# Patient Record
Sex: MALE | Race: WHITE | NOT HISPANIC OR LATINO | Employment: OTHER | URBAN - METROPOLITAN AREA
[De-identification: names, ages, dates, MRNs, and addresses within clinical notes are randomized per-mention and may not be internally consistent; named-entity substitution may affect disease eponyms.]

---

## 2017-02-23 ENCOUNTER — OFFICE VISIT (OUTPATIENT)
Dept: INTERNAL MEDICINE | Facility: CLINIC | Age: 62
End: 2017-02-23

## 2017-02-23 VITALS
DIASTOLIC BLOOD PRESSURE: 80 MMHG | BODY MASS INDEX: 28.06 KG/M2 | WEIGHT: 196 LBS | HEIGHT: 70 IN | HEART RATE: 64 BPM | SYSTOLIC BLOOD PRESSURE: 120 MMHG

## 2017-02-23 DIAGNOSIS — Z00.00 ROUTINE GENERAL MEDICAL EXAMINATION AT A HEALTH CARE FACILITY: ICD-10-CM

## 2017-02-23 DIAGNOSIS — K57.30 DIVERTICULOSIS OF LARGE INTESTINE WITHOUT HEMORRHAGE: ICD-10-CM

## 2017-02-23 DIAGNOSIS — I10 ESSENTIAL HYPERTENSION: Primary | ICD-10-CM

## 2017-02-23 LAB
ALBUMIN SERPL-MCNC: 4.4 G/DL (ref 3.2–4.8)
ALBUMIN/GLOB SERPL: 1.6 G/DL (ref 1.5–2.5)
ALP SERPL-CCNC: 51 U/L (ref 25–100)
ALT SERPL W P-5'-P-CCNC: 28 U/L (ref 7–40)
ANION GAP SERPL CALCULATED.3IONS-SCNC: 2 MMOL/L (ref 3–11)
ARTICHOKE IGE QN: 143 MG/DL (ref 0–130)
AST SERPL-CCNC: 28 U/L (ref 0–33)
BILIRUB BLD-MCNC: NEGATIVE MG/DL
BILIRUB SERPL-MCNC: 0.7 MG/DL (ref 0.3–1.2)
BUN BLD-MCNC: 15 MG/DL (ref 9–23)
BUN/CREAT SERPL: 15 (ref 7–25)
CALCIUM SPEC-SCNC: 9.5 MG/DL (ref 8.7–10.4)
CHLORIDE SERPL-SCNC: 103 MMOL/L (ref 99–109)
CHOLEST SERPL-MCNC: 219 MG/DL (ref 0–200)
CLARITY, POC: CLEAR
CO2 SERPL-SCNC: 34 MMOL/L (ref 20–31)
COLOR UR: YELLOW
CREAT BLD-MCNC: 1 MG/DL (ref 0.6–1.3)
DEPRECATED RDW RBC AUTO: 41.3 FL (ref 37–54)
DEVELOPER EXPIRATION DATE: NORMAL
DEVELOPER LOT NUMBER: NORMAL
ERYTHROCYTE [DISTWIDTH] IN BLOOD BY AUTOMATED COUNT: 13 % (ref 11.3–14.5)
EXPIRATION DATE: NORMAL
FECAL OCCULT BLOOD SCREEN, POC: NEGATIVE
GFR SERPL CREATININE-BSD FRML MDRD: 76 ML/MIN/1.73
GLOBULIN UR ELPH-MCNC: 2.7 GM/DL
GLUCOSE BLD-MCNC: 99 MG/DL (ref 70–100)
GLUCOSE UR STRIP-MCNC: NEGATIVE MG/DL
HCT VFR BLD AUTO: 47.4 % (ref 38.9–50.9)
HDLC SERPL-MCNC: 63 MG/DL (ref 40–60)
HGB BLD-MCNC: 16.5 G/DL (ref 13.1–17.5)
KETONES UR QL: NEGATIVE
LEUKOCYTE EST, POC: NEGATIVE
Lab: NORMAL
MCH RBC QN AUTO: 30.6 PG (ref 27–31)
MCHC RBC AUTO-ENTMCNC: 34.8 G/DL (ref 32–36)
MCV RBC AUTO: 87.8 FL (ref 80–99)
NEGATIVE CONTROL: NEGATIVE
NITRITE UR-MCNC: NEGATIVE MG/ML
PH UR: 8 [PH] (ref 5–8)
PLATELET # BLD AUTO: 223 10*3/MM3 (ref 150–450)
PMV BLD AUTO: 9.2 FL (ref 6–12)
POSITIVE CONTROL: POSITIVE
POTASSIUM BLD-SCNC: 4.5 MMOL/L (ref 3.5–5.5)
PROT SERPL-MCNC: 7.1 G/DL (ref 5.7–8.2)
PROT UR STRIP-MCNC: NEGATIVE MG/DL
PSA SERPL-MCNC: 0.7 NG/ML (ref 0–4)
RBC # BLD AUTO: 5.4 10*6/MM3 (ref 4.2–5.76)
RBC # UR STRIP: NEGATIVE /UL
SODIUM BLD-SCNC: 139 MMOL/L (ref 132–146)
SP GR UR: 1.01 (ref 1–1.03)
TRIGL SERPL-MCNC: 98 MG/DL (ref 0–150)
TSH SERPL DL<=0.05 MIU/L-ACNC: 2.33 MIU/ML (ref 0.35–5.35)
UROBILINOGEN UR QL: NORMAL
VIT B12 BLD-MCNC: 333 PG/ML (ref 211–911)
WBC NRBC COR # BLD: 6.01 10*3/MM3 (ref 3.5–10.8)

## 2017-02-23 PROCEDURE — 82270 OCCULT BLOOD FECES: CPT | Performed by: INTERNAL MEDICINE

## 2017-02-23 PROCEDURE — 80053 COMPREHEN METABOLIC PANEL: CPT | Performed by: INTERNAL MEDICINE

## 2017-02-23 PROCEDURE — 85027 COMPLETE CBC AUTOMATED: CPT | Performed by: INTERNAL MEDICINE

## 2017-02-23 PROCEDURE — 82607 VITAMIN B-12: CPT | Performed by: INTERNAL MEDICINE

## 2017-02-23 PROCEDURE — 81003 URINALYSIS AUTO W/O SCOPE: CPT | Performed by: INTERNAL MEDICINE

## 2017-02-23 PROCEDURE — 84153 ASSAY OF PSA TOTAL: CPT | Performed by: INTERNAL MEDICINE

## 2017-02-23 PROCEDURE — 99396 PREV VISIT EST AGE 40-64: CPT | Performed by: INTERNAL MEDICINE

## 2017-02-23 PROCEDURE — 84443 ASSAY THYROID STIM HORMONE: CPT | Performed by: INTERNAL MEDICINE

## 2017-02-23 PROCEDURE — 80061 LIPID PANEL: CPT | Performed by: INTERNAL MEDICINE

## 2017-02-23 PROCEDURE — 36415 COLL VENOUS BLD VENIPUNCTURE: CPT | Performed by: INTERNAL MEDICINE

## 2017-02-23 NOTE — PROGRESS NOTES
Patient is a 61 y.o. male who is here for a physical.  Chief Complaint   Patient presents with   • Annual Exam         HPI:  Here for CPE.  Still gets occasional tinnitus.  Rare dizziness.  Energy level is good.  No HA's.  No abdominal pains.  No nocturia.  No ankle edema.       History:    Patient Active Problem List   Diagnosis   • Diverticulosis of large intestine without hemorrhage   • Essential hypertension   • Headache   • Dizziness   • Routine general medical examination at a health care facility       Past Medical History   Diagnosis Date   • Encounter for preventive health examination        No past surgical history on file.    Current Outpatient Prescriptions on File Prior to Visit   Medication Sig   • lisinopril (PRINIVIL,ZESTRIL) 10 MG tablet Take 1 tablet by mouth Daily.   • [DISCONTINUED] doxycycline (VIBRAMYCIN) 100 MG capsule Take 1 capsule by mouth 2 (Two) Times a Day.     No current facility-administered medications on file prior to visit.        Family History   Problem Relation Age of Onset   • Hypertension Mother    • Stroke Father      CVA   • Hypertension Father    • Diabetes Sister    • Coronary artery disease Other        Social History     Social History   • Marital status:      Spouse name: N/A   • Number of children: N/A   • Years of education: N/A     Occupational History   • Not on file.     Social History Main Topics   • Smoking status: Never Smoker   • Smokeless tobacco: Not on file   • Alcohol use Not on file   • Drug use: Not on file   • Sexual activity: Not on file     Other Topics Concern   • Not on file     Social History Narrative         ROS:    Review of Systems   Constitutional: Negative for chills, fatigue, fever and unexpected weight change.   HENT: Negative for congestion, ear pain, hearing loss, rhinorrhea, sinus pressure, sore throat and trouble swallowing.    Eyes: Negative for discharge and itching.   Respiratory: Negative for cough, chest tightness and  "shortness of breath.    Cardiovascular: Negative for chest pain, palpitations and leg swelling.   Gastrointestinal: Negative for abdominal pain, blood in stool, constipation, diarrhea and vomiting.        2012 colonoscopy   Endocrine: Negative for polydipsia and polyuria.   Genitourinary: Negative for difficulty urinating, dysuria, enuresis, frequency, hematuria and urgency.        2/17 psa   Musculoskeletal: Negative for arthralgias, back pain, gait problem and joint swelling.   Skin: Negative for rash and wound.   Allergic/Immunologic: Negative for immunocompromised state.   Neurological: Negative for dizziness, syncope, weakness, light-headedness, numbness and headaches.   Hematological: Does not bruise/bleed easily.   Psychiatric/Behavioral: Negative for behavioral problems, dysphoric mood and sleep disturbance. The patient is not nervous/anxious.        Visit Vitals   • /80   • Pulse 64   • Ht 70\" (177.8 cm)   • Wt 196 lb (88.9 kg)   • BMI 28.12 kg/m2       Physical Exam:    Physical Exam   Constitutional: He is oriented to person, place, and time. He appears well-developed and well-nourished.   HENT:   Head: Normocephalic and atraumatic.   Right Ear: External ear normal.   Left Ear: External ear normal.   Mouth/Throat: Oropharynx is clear and moist.   Eyes: Conjunctivae and EOM are normal. Pupils are equal, round, and reactive to light.   Neck: Normal range of motion. Neck supple.   Cardiovascular: Normal rate, regular rhythm, normal heart sounds and intact distal pulses.    Pulmonary/Chest: Effort normal and breath sounds normal.   Abdominal: Soft. Bowel sounds are normal.   Genitourinary: Rectum normal and prostate normal. Rectal exam shows guaiac negative stool.   Musculoskeletal: Normal range of motion.   Neurological: He is alert and oriented to person, place, and time. He has normal reflexes.   Skin: Skin is warm and dry.   Psychiatric: He has a normal mood and affect. His behavior is normal. " Judgment and thought content normal.   Vitals reviewed.      Procedure:      Discussion/Summary:  diverticulosis-increase fiber  Elevated BP-cont lisinopril qhs, counseled on low Na and to avoid NSAIDS  HA and Dizziness-MRI noted, cont flonase  High risk meds-labs noted  HA-tylenol prn  HME-labs today, counseled on diet and exercise      Labs noted and dw patient, counseled on diet   Reviewed the following with the patient: advised patient to avoid alcoholic beverages and encouraged patient to exercise 5-7 days per week for 30 minutes at a time.        Current Outpatient Prescriptions:   •  lisinopril (PRINIVIL,ZESTRIL) 10 MG tablet, Take 1 tablet by mouth Daily., Disp: 90 tablet, Rfl: 3        Oleg was seen today for annual exam.    Diagnoses and all orders for this visit:    Essential hypertension    Diverticulosis of large intestine without hemorrhage    Routine general medical examination at a health care facility  -     CBC (No Diff)  -     Comprehensive Metabolic Panel  -     Lipid Panel  -     PSA  -     TSH  -     Vitamin B12  -     POC Urinalysis Dipstick, Automated  -     POC Occult Blood Stool

## 2017-11-14 ENCOUNTER — OFFICE VISIT (OUTPATIENT)
Dept: INTERNAL MEDICINE | Facility: CLINIC | Age: 62
End: 2017-11-14

## 2017-11-14 VITALS
HEIGHT: 70 IN | DIASTOLIC BLOOD PRESSURE: 74 MMHG | HEART RATE: 68 BPM | BODY MASS INDEX: 28.63 KG/M2 | SYSTOLIC BLOOD PRESSURE: 122 MMHG | WEIGHT: 200 LBS

## 2017-11-14 DIAGNOSIS — R42 DIZZINESS: ICD-10-CM

## 2017-11-14 DIAGNOSIS — K57.30 DIVERTICULOSIS OF LARGE INTESTINE WITHOUT HEMORRHAGE: ICD-10-CM

## 2017-11-14 DIAGNOSIS — I10 ESSENTIAL HYPERTENSION: Primary | ICD-10-CM

## 2017-11-14 PROCEDURE — 99213 OFFICE O/P EST LOW 20 MIN: CPT | Performed by: INTERNAL MEDICINE

## 2017-11-14 RX ORDER — LISINOPRIL 10 MG/1
10 TABLET ORAL DAILY
Qty: 90 TABLET | Refills: 3 | Status: SHIPPED | OUTPATIENT
Start: 2017-11-14 | End: 2018-11-17 | Stop reason: SDUPTHER

## 2017-11-14 RX ORDER — TADALAFIL 20 MG/1
20 TABLET ORAL DAILY PRN
Qty: 4 TABLET | Refills: 5 | Status: SHIPPED | OUTPATIENT
Start: 2017-11-14 | End: 2018-08-21

## 2017-11-14 NOTE — PROGRESS NOTES
Patient is a 62 y.o. male who is here for a follow up of chronic conditions.  Chief Complaint   Patient presents with   • Hypertension         HPI:  Here for f/u.  BP has been good.  Occasional dizziness with change in position.  No Ha's.  No abdominal pains.  No dizziness or lightheadedness.      History:    Patient Active Problem List   Diagnosis   • Diverticulosis of large intestine without hemorrhage   • Essential hypertension   • Headache   • Dizziness   • Routine general medical examination at a health care facility       Past Medical History:   Diagnosis Date   • Encounter for preventive health examination        No past surgical history on file.    Current Outpatient Prescriptions on File Prior to Visit   Medication Sig   • [DISCONTINUED] lisinopril (PRINIVIL,ZESTRIL) 10 MG tablet Take 1 tablet by mouth Daily.     No current facility-administered medications on file prior to visit.        Family History   Problem Relation Age of Onset   • Hypertension Mother    • Stroke Father      CVA   • Hypertension Father    • Diabetes Sister    • Coronary artery disease Other        Social History     Social History   • Marital status:      Spouse name: N/A   • Number of children: N/A   • Years of education: N/A     Occupational History   • Not on file.     Social History Main Topics   • Smoking status: Never Smoker   • Smokeless tobacco: Not on file   • Alcohol use Not on file   • Drug use: Not on file   • Sexual activity: Not on file     Other Topics Concern   • Not on file     Social History Narrative         ROS:    Review of Systems   Constitutional: Negative for chills, fatigue, fever and unexpected weight change.   HENT: Negative for congestion, ear pain, hearing loss, rhinorrhea, sinus pressure, sore throat and trouble swallowing.    Eyes: Negative for discharge and itching.   Respiratory: Negative for cough, chest tightness and shortness of breath.    Cardiovascular: Negative for chest pain, palpitations  "and leg swelling.   Gastrointestinal: Negative for abdominal pain, blood in stool, constipation, diarrhea and vomiting.        2012 colonoscopy   Endocrine: Negative for polydipsia and polyuria.   Genitourinary: Negative for difficulty urinating, dysuria, enuresis, frequency, hematuria and urgency.        2/17 psa   Musculoskeletal: Negative for arthralgias, back pain, gait problem and joint swelling.   Skin: Negative for rash and wound.   Allergic/Immunologic: Negative for immunocompromised state.   Neurological: Negative for dizziness, syncope, weakness, light-headedness, numbness and headaches.   Hematological: Does not bruise/bleed easily.   Psychiatric/Behavioral: Negative for behavioral problems, dysphoric mood and sleep disturbance. The patient is not nervous/anxious.        /74  Pulse 68  Ht 70\" (177.8 cm)  Wt 200 lb (90.7 kg)  BMI 28.7 kg/m2    Physical Exam:    Physical Exam   Constitutional: He is oriented to person, place, and time. He appears well-developed and well-nourished.   HENT:   Head: Normocephalic and atraumatic.   Right Ear: External ear normal.   Left Ear: External ear normal.   Mouth/Throat: Oropharynx is clear and moist.   Eyes: Conjunctivae and EOM are normal. Pupils are equal, round, and reactive to light.   Neck: Normal range of motion. Neck supple.   Cardiovascular: Normal rate, regular rhythm, normal heart sounds and intact distal pulses.    Pulmonary/Chest: Effort normal and breath sounds normal.   Abdominal: Soft. Bowel sounds are normal.   Musculoskeletal: Normal range of motion.   Neurological: He is alert and oriented to person, place, and time. He has normal reflexes.   Skin: Skin is warm and dry.   Psychiatric: He has a normal mood and affect. His behavior is normal. Judgment and thought content normal.   Vitals reviewed.      Procedure:      Discussion/Summary:  diverticulosis-increase fiber  Elevated BP-cont lisinopril qhs, counseled on low Na and to avoid NSAIDS  HA " and Dizziness-MRI noted, cont flonase  High risk meds-labs noted  HA-tylenol prn    Refusing flu shot      Current Outpatient Prescriptions:   •  lisinopril (PRINIVIL,ZESTRIL) 10 MG tablet, Take 1 tablet by mouth Daily., Disp: 90 tablet, Rfl: 3  •  tadalafil (CIALIS) 20 MG tablet, Take 1 tablet by mouth Daily As Needed for erectile dysfunction., Disp: 4 tablet, Rfl: 5        Oleg was seen today for hypertension.    Diagnoses and all orders for this visit:    Essential hypertension  -     lisinopril (PRINIVIL,ZESTRIL) 10 MG tablet; Take 1 tablet by mouth Daily.    Diverticulosis of large intestine without hemorrhage    Dizziness    Other orders  -     tadalafil (CIALIS) 20 MG tablet; Take 1 tablet by mouth Daily As Needed for erectile dysfunction.

## 2018-05-18 ENCOUNTER — TELEPHONE (OUTPATIENT)
Dept: INTERNAL MEDICINE | Facility: CLINIC | Age: 63
End: 2018-05-18

## 2018-05-23 ENCOUNTER — TELEPHONE (OUTPATIENT)
Dept: INTERNAL MEDICINE | Facility: CLINIC | Age: 63
End: 2018-05-23

## 2018-05-23 NOTE — TELEPHONE ENCOUNTER
PT HAS MADE ANOTHER APT FOR A PHYSICAL BUT HE WAS WANTING TO NO IF HE COULD GET ONE SOONER AND A DIFFERENT TIME. PLEASE CALL THE PT BACK.

## 2018-08-21 ENCOUNTER — OFFICE VISIT (OUTPATIENT)
Dept: INTERNAL MEDICINE | Facility: CLINIC | Age: 63
End: 2018-08-21

## 2018-08-21 VITALS
DIASTOLIC BLOOD PRESSURE: 74 MMHG | HEART RATE: 68 BPM | BODY MASS INDEX: 28.35 KG/M2 | HEIGHT: 70 IN | WEIGHT: 198 LBS | SYSTOLIC BLOOD PRESSURE: 110 MMHG

## 2018-08-21 DIAGNOSIS — Z12.11 SCREEN FOR COLON CANCER: ICD-10-CM

## 2018-08-21 DIAGNOSIS — I10 ESSENTIAL HYPERTENSION: Primary | ICD-10-CM

## 2018-08-21 DIAGNOSIS — K57.30 DIVERTICULOSIS OF LARGE INTESTINE WITHOUT HEMORRHAGE: ICD-10-CM

## 2018-08-21 DIAGNOSIS — Z00.00 ROUTINE GENERAL MEDICAL EXAMINATION AT A HEALTH CARE FACILITY: ICD-10-CM

## 2018-08-21 LAB
DEVELOPER EXPIRATION DATE: NORMAL
DEVELOPER LOT NUMBER: NORMAL
EXPIRATION DATE: NORMAL
FECAL OCCULT BLOOD SCREEN, POC: NEGATIVE
Lab: NORMAL
NEGATIVE CONTROL: NEGATIVE
POSITIVE CONTROL: POSITIVE

## 2018-08-21 PROCEDURE — 99396 PREV VISIT EST AGE 40-64: CPT | Performed by: INTERNAL MEDICINE

## 2018-08-21 PROCEDURE — 82270 OCCULT BLOOD FECES: CPT | Performed by: INTERNAL MEDICINE

## 2018-08-21 NOTE — PROGRESS NOTES
Patient is a 63 y.o. male who is here for a physical.  Chief Complaint   Patient presents with   • Annual Exam         HPI:    Here for CPE.      History:    Patient Active Problem List   Diagnosis   • Diverticulosis of large intestine without hemorrhage   • Essential hypertension   • Headache   • Dizziness   • Routine general medical examination at a health care facility       Past Medical History:   Diagnosis Date   • Encounter for preventive health examination        No past surgical history on file.    Current Outpatient Prescriptions on File Prior to Visit   Medication Sig   • lisinopril (PRINIVIL,ZESTRIL) 10 MG tablet Take 1 tablet by mouth Daily.   • [DISCONTINUED] tadalafil (CIALIS) 20 MG tablet Take 1 tablet by mouth Daily As Needed for erectile dysfunction.     No current facility-administered medications on file prior to visit.        Family History   Problem Relation Age of Onset   • Hypertension Mother    • Stroke Father         CVA   • Hypertension Father    • Diabetes Sister    • Coronary artery disease Other        Social History     Social History   • Marital status:      Spouse name: N/A   • Number of children: N/A   • Years of education: N/A     Occupational History   • Not on file.     Social History Main Topics   • Smoking status: Never Smoker   • Smokeless tobacco: Not on file   • Alcohol use Not on file   • Drug use: Unknown   • Sexual activity: Not on file     Other Topics Concern   • Not on file     Social History Narrative   • No narrative on file         Review of Systems   Constitutional: Negative for chills, fatigue, fever and unexpected weight change.   HENT: Negative for congestion, ear pain, hearing loss, rhinorrhea, sinus pressure, sore throat and trouble swallowing.    Eyes: Negative for discharge and itching.   Respiratory: Negative for cough, chest tightness and shortness of breath.    Cardiovascular: Negative for chest pain, palpitations and leg swelling.  "  Gastrointestinal: Negative for abdominal pain, blood in stool, constipation, diarrhea and vomiting.        2012 colonoscopy   Endocrine: Negative for polydipsia and polyuria.   Genitourinary: Negative for difficulty urinating, dysuria, enuresis, frequency, hematuria and urgency.        8/18 EARLINE   Musculoskeletal: Negative for arthralgias, back pain, gait problem and joint swelling.   Skin: Negative for rash and wound.   Allergic/Immunologic: Negative for immunocompromised state.   Neurological: Negative for dizziness, syncope, weakness, light-headedness, numbness and headaches.   Hematological: Does not bruise/bleed easily.   Psychiatric/Behavioral: Negative for behavioral problems, dysphoric mood and sleep disturbance. The patient is not nervous/anxious.        /74   Pulse 68   Ht 177.8 cm (70\")   Wt 89.8 kg (198 lb)   BMI 28.41 kg/m²       Physical Exam   Constitutional: He is oriented to person, place, and time. He appears well-developed and well-nourished.   HENT:   Head: Normocephalic and atraumatic.   Right Ear: External ear normal.   Left Ear: External ear normal.   Mouth/Throat: Oropharynx is clear and moist.   Eyes: Pupils are equal, round, and reactive to light. Conjunctivae and EOM are normal.   Neck: Normal range of motion. Neck supple.   Cardiovascular: Normal rate, regular rhythm, normal heart sounds and intact distal pulses.    Pulmonary/Chest: Effort normal and breath sounds normal.   Abdominal: Soft. Bowel sounds are normal.   Genitourinary: Rectum normal and prostate normal.   Musculoskeletal: Normal range of motion.   Neurological: He is alert and oriented to person, place, and time. He has normal reflexes.   Skin: Skin is warm and dry.   Psychiatric: He has a normal mood and affect. His behavior is normal. Judgment and thought content normal.   Vitals reviewed.      Procedure:      Discussion/Summary:    diverticulosis-increase fiber  Elevated BP-cont lisinopril qhs, counseled on low " Na and to avoid NSAIDS  HA and Dizziness-MRI noted, cont flonase  High risk meds-labs noted  HA-tylenol prn  HME-labs today, counseled on diet and exercise      Labs noted and dw patient, counseled on diet   Reviewed the following with the patient: advised patient to avoid alcoholic beverages and encouraged patient to exercise 5-7 days per week for 30 minutes at a time.    Current Outpatient Prescriptions:   •  lisinopril (PRINIVIL,ZESTRIL) 10 MG tablet, Take 1 tablet by mouth Daily., Disp: 90 tablet, Rfl: 3        Oleg was seen today for annual exam.    Diagnoses and all orders for this visit:    Essential hypertension  -     POC Urinalysis Dipstick, Automated    Diverticulosis of large intestine without hemorrhage    Routine general medical examination at a health care facility  -     POC Urinalysis Dipstick, Automated  -     POC Occult Blood Stool    Screen for colon cancer  -     POC Occult Blood Stool

## 2018-11-17 DIAGNOSIS — I10 ESSENTIAL HYPERTENSION: ICD-10-CM

## 2018-11-19 RX ORDER — LISINOPRIL 10 MG/1
TABLET ORAL
Qty: 90 TABLET | Refills: 3 | Status: SHIPPED | OUTPATIENT
Start: 2018-11-19 | End: 2019-11-13 | Stop reason: SDUPTHER

## 2018-11-20 PROBLEM — K57.30 SIGMOID DIVERTICULOSIS: Status: ACTIVE | Noted: 2018-11-20

## 2019-02-21 ENCOUNTER — OFFICE VISIT (OUTPATIENT)
Dept: INTERNAL MEDICINE | Facility: CLINIC | Age: 64
End: 2019-02-21

## 2019-02-21 VITALS
DIASTOLIC BLOOD PRESSURE: 70 MMHG | SYSTOLIC BLOOD PRESSURE: 110 MMHG | BODY MASS INDEX: 28.92 KG/M2 | HEIGHT: 70 IN | HEART RATE: 68 BPM | WEIGHT: 202 LBS

## 2019-02-21 DIAGNOSIS — K57.30 DIVERTICULOSIS OF LARGE INTESTINE WITHOUT HEMORRHAGE: ICD-10-CM

## 2019-02-21 DIAGNOSIS — E78.2 MIXED HYPERLIPIDEMIA: ICD-10-CM

## 2019-02-21 DIAGNOSIS — K57.30 SIGMOID DIVERTICULOSIS: ICD-10-CM

## 2019-02-21 DIAGNOSIS — I10 ESSENTIAL HYPERTENSION: Primary | ICD-10-CM

## 2019-02-21 LAB
ALBUMIN SERPL-MCNC: 4.41 G/DL (ref 3.2–4.8)
ALBUMIN/GLOB SERPL: 2 G/DL (ref 1.5–2.5)
ALP SERPL-CCNC: 60 U/L (ref 25–100)
ALT SERPL-CCNC: 24 U/L (ref 7–40)
AST SERPL-CCNC: 25 U/L (ref 0–33)
BILIRUB SERPL-MCNC: 0.6 MG/DL (ref 0.3–1.2)
BUN SERPL-MCNC: 17 MG/DL (ref 9–23)
BUN/CREAT SERPL: 16.2 (ref 7–25)
CALCIUM SERPL-MCNC: 9.3 MG/DL (ref 8.7–10.4)
CHLORIDE SERPL-SCNC: 103 MMOL/L (ref 99–109)
CHOLEST SERPL-MCNC: 219 MG/DL (ref 0–200)
CO2 SERPL-SCNC: 30 MMOL/L (ref 20–31)
CREAT SERPL-MCNC: 1.05 MG/DL (ref 0.6–1.3)
GLOBULIN SER CALC-MCNC: 2.2 GM/DL
GLUCOSE SERPL-MCNC: 84 MG/DL (ref 70–100)
HDLC SERPL-MCNC: 49 MG/DL (ref 40–60)
LDLC SERPL CALC-MCNC: 133 MG/DL (ref 0–100)
POTASSIUM SERPL-SCNC: 5.5 MMOL/L (ref 3.5–5.5)
PROT SERPL-MCNC: 6.6 G/DL (ref 5.7–8.2)
SODIUM SERPL-SCNC: 140 MMOL/L (ref 132–146)
TRIGL SERPL-MCNC: 184 MG/DL (ref 0–150)
VLDLC SERPL CALC-MCNC: 36.8 MG/DL

## 2019-02-21 PROCEDURE — 99213 OFFICE O/P EST LOW 20 MIN: CPT | Performed by: INTERNAL MEDICINE

## 2019-02-21 NOTE — PROGRESS NOTES
Patient is a 63 y.o. male who is here for a follow up of chronic conditions.  Chief Complaint   Patient presents with   • Hypertension         HPI:    Here for f/u.  Doing good.  BP has been good.  No dizziness or lightheadedness.  No abdominal pains.      History:    Patient Active Problem List   Diagnosis   • Diverticulosis of large intestine without hemorrhage   • Essential hypertension   • Headache   • Dizziness   • Routine general medical examination at a health care facility   • Sigmoid diverticulosis   • Mixed hyperlipidemia       Past Medical History:   Diagnosis Date   • Encounter for preventive health examination        No past surgical history on file.    Current Outpatient Medications on File Prior to Visit   Medication Sig   • lisinopril (PRINIVIL,ZESTRIL) 10 MG tablet take 1 tablet by mouth once daily     No current facility-administered medications on file prior to visit.        Family History   Problem Relation Age of Onset   • Hypertension Mother    • Stroke Father         CVA   • Hypertension Father    • Diabetes Sister    • Coronary artery disease Other        Social History     Socioeconomic History   • Marital status:      Spouse name: Not on file   • Number of children: Not on file   • Years of education: Not on file   • Highest education level: Not on file   Social Needs   • Financial resource strain: Not on file   • Food insecurity - worry: Not on file   • Food insecurity - inability: Not on file   • Transportation needs - medical: Not on file   • Transportation needs - non-medical: Not on file   Occupational History   • Not on file   Tobacco Use   • Smoking status: Never Smoker   Substance and Sexual Activity   • Alcohol use: Not on file   • Drug use: Not on file   • Sexual activity: Not on file   Other Topics Concern   • Not on file   Social History Narrative   • Not on file         Review of Systems   Constitutional: Negative for chills, fatigue, fever and unexpected weight change.  "  HENT: Negative for congestion, ear pain, hearing loss, rhinorrhea, sinus pressure, sore throat and trouble swallowing.    Eyes: Negative for discharge and itching.   Respiratory: Negative for cough, chest tightness and shortness of breath.    Cardiovascular: Negative for chest pain, palpitations and leg swelling.   Gastrointestinal: Negative for abdominal pain, blood in stool, constipation, diarrhea and vomiting.        11/18 colonoscopy   Endocrine: Negative for polydipsia and polyuria.   Genitourinary: Negative for difficulty urinating, dysuria, enuresis, frequency, hematuria and urgency.        8/18 EARLINE   Musculoskeletal: Negative for arthralgias, back pain, gait problem and joint swelling.   Skin: Negative for rash and wound.   Allergic/Immunologic: Negative for immunocompromised state.   Neurological: Negative for dizziness, syncope, weakness, light-headedness, numbness and headaches.   Hematological: Does not bruise/bleed easily.   Psychiatric/Behavioral: Negative for behavioral problems, dysphoric mood and sleep disturbance. The patient is not nervous/anxious.        /70   Pulse 68   Ht 177.8 cm (70\")   Wt 91.6 kg (202 lb)   BMI 28.98 kg/m²       Physical Exam   Constitutional: He is oriented to person, place, and time. He appears well-developed and well-nourished.   HENT:   Head: Normocephalic and atraumatic.   Right Ear: External ear normal.   Left Ear: External ear normal.   Mouth/Throat: Oropharynx is clear and moist.   Eyes: Conjunctivae and EOM are normal. Pupils are equal, round, and reactive to light.   Neck: Normal range of motion. Neck supple.   Cardiovascular: Normal rate, regular rhythm, normal heart sounds and intact distal pulses.   Pulmonary/Chest: Effort normal and breath sounds normal.   Abdominal: Soft. Bowel sounds are normal.   Musculoskeletal: Normal range of motion.   Neurological: He is alert and oriented to person, place, and time. He has normal reflexes.   Skin: Skin is " warm and dry.   Psychiatric: He has a normal mood and affect. His behavior is normal. Judgment and thought content normal.   Vitals reviewed.      Procedure:      Discussion/Summary:    diverticulosis-increase fiber  Elevated BP-cont lisinopril qhs, counseled on low Na and to avoid NSAIDS  HA and Dizziness-MRI noted, cont flonase  Hyperlipidemia-labs today  High risk meds-labs today    Labs noted and dw patient, counseled on low chol and advised statin tx          Current Outpatient Medications:   •  lisinopril (PRINIVIL,ZESTRIL) 10 MG tablet, take 1 tablet by mouth once daily, Disp: 90 tablet, Rfl: 3        Oleg was seen today for hypertension.    Diagnoses and all orders for this visit:    Essential hypertension    Sigmoid diverticulosis    Diverticulosis of large intestine without hemorrhage    Mixed hyperlipidemia  -     Comprehensive Metabolic Panel  -     Lipid Panel    Other orders  -     Comprehensive Metabolic Panel  -     Lipid Panel

## 2019-11-13 DIAGNOSIS — I10 ESSENTIAL HYPERTENSION: ICD-10-CM

## 2019-11-14 RX ORDER — LISINOPRIL 10 MG/1
TABLET ORAL
Qty: 90 TABLET | Refills: 3 | Status: SHIPPED | OUTPATIENT
Start: 2019-11-14 | End: 2020-11-02

## 2020-06-05 ENCOUNTER — TELEPHONE (OUTPATIENT)
Dept: INTERNAL MEDICINE | Facility: CLINIC | Age: 65
End: 2020-06-05

## 2020-06-05 NOTE — TELEPHONE ENCOUNTER
Advised pt to go to [facility] just in case bc he is having bruising and swelling. Made appt with you on monday

## 2020-06-05 NOTE — TELEPHONE ENCOUNTER
Pt called because she states that he is having pain in his Achilles tendon up to his calf muscle on the left side. Pt was offered 1st available appt, pt would like medical advice     Please call pt to advise   [phone]

## 2020-06-08 ENCOUNTER — OFFICE VISIT (OUTPATIENT)
Dept: INTERNAL MEDICINE | Facility: CLINIC | Age: 65
End: 2020-06-08

## 2020-06-08 VITALS
OXYGEN SATURATION: 97 % | HEIGHT: 70 IN | BODY MASS INDEX: 28.49 KG/M2 | SYSTOLIC BLOOD PRESSURE: 110 MMHG | TEMPERATURE: 98 F | WEIGHT: 199 LBS | HEART RATE: 82 BPM | DIASTOLIC BLOOD PRESSURE: 70 MMHG

## 2020-06-08 DIAGNOSIS — K57.30 SIGMOID DIVERTICULOSIS: ICD-10-CM

## 2020-06-08 DIAGNOSIS — I10 ESSENTIAL HYPERTENSION: ICD-10-CM

## 2020-06-08 DIAGNOSIS — M79.662 PAIN OF LEFT CALF: ICD-10-CM

## 2020-06-08 DIAGNOSIS — E78.2 MIXED HYPERLIPIDEMIA: Primary | ICD-10-CM

## 2020-06-08 PROCEDURE — 99214 OFFICE O/P EST MOD 30 MIN: CPT | Performed by: INTERNAL MEDICINE

## 2020-06-08 NOTE — PROGRESS NOTES
Patient is a 64 y.o. male who is here for a follow up of hyperlipidemia and hypertension.  Chief Complaint   Patient presents with   • Hyperlipidemia   • Hypertension         HPI:    Patient last Wednesday was pushing his car and heard a pop in left calf.  Initially had difficulty with plantar flexion.  Some bruising and difficulty walking.  No falls.  Symptoms getting better.  Using ibuprofen with some help.    BP has been good.  No dizziness or lightheadedness.  No HAs.      History:     Patient Active Problem List   Diagnosis   • Diverticulosis of large intestine without hemorrhage   • Essential hypertension   • Headache   • Routine general medical examination at a health care facility   • Sigmoid diverticulosis   • Mixed hyperlipidemia   • Pain of left calf       Past Medical History:   Diagnosis Date   • Encounter for preventive health examination        No past surgical history on file.    Current Outpatient Medications on File Prior to Visit   Medication Sig   • lisinopril (PRINIVIL,ZESTRIL) 10 MG tablet take 1 tablet by mouth once daily     No current facility-administered medications on file prior to visit.        Family History   Problem Relation Age of Onset   • Hypertension Mother    • Stroke Father         CVA   • Hypertension Father    • Diabetes Sister    • Coronary artery disease Other        Social History     Socioeconomic History   • Marital status:      Spouse name: Not on file   • Number of children: Not on file   • Years of education: Not on file   • Highest education level: Not on file   Tobacco Use   • Smoking status: Never Smoker         Review of Systems   Constitutional: Negative for chills, fatigue, fever and unexpected weight change.   HENT: Negative for congestion, ear pain, hearing loss, rhinorrhea, sinus pressure, sore throat and trouble swallowing.    Eyes: Negative for discharge and itching.   Respiratory: Negative for cough, chest tightness and shortness of breath.   "  Cardiovascular: Negative for chest pain, palpitations and leg swelling.   Gastrointestinal: Negative for abdominal pain, blood in stool, constipation, diarrhea and vomiting.        11/18 colonoscopy   Endocrine: Negative for polydipsia and polyuria.   Genitourinary: Negative for difficulty urinating, dysuria, enuresis, frequency, hematuria and urgency.        8/18 EARLINE   Musculoskeletal: Positive for arthralgias, gait problem and joint swelling. Negative for back pain.   Skin: Negative for rash and wound.   Allergic/Immunologic: Negative for immunocompromised state.   Neurological: Negative for dizziness, syncope, weakness, light-headedness, numbness and headaches.   Hematological: Does not bruise/bleed easily.   Psychiatric/Behavioral: Negative for behavioral problems, dysphoric mood and sleep disturbance. The patient is not nervous/anxious.        /70   Pulse 82   Temp 98 °F (36.7 °C) (Temporal)   Ht 177.8 cm (70\")   Wt 90.3 kg (199 lb)   SpO2 97%   BMI 28.55 kg/m²       Physical Exam   Constitutional: He is oriented to person, place, and time. He appears well-developed and well-nourished.   HENT:   Head: Normocephalic and atraumatic.   Right Ear: External ear normal.   Left Ear: External ear normal.   Mouth/Throat: Oropharynx is clear and moist.   Eyes: Conjunctivae and EOM are normal.   Neck: Normal range of motion. Neck supple.   Cardiovascular: Normal rate, regular rhythm and normal heart sounds.   Pulmonary/Chest: Effort normal and breath sounds normal.   Abdominal: Soft. Bowel sounds are normal.   Musculoskeletal: He exhibits edema (LLE).   Decreased plantar flexion of the left ankle.  With bruising.  Moderate tenderness of left gastroc.  Minimal edema   Lymphadenopathy:     He has no cervical adenopathy.   Neurological: He is alert and oriented to person, place, and time.   Skin: Skin is warm and dry.   Psychiatric: He has a normal mood and affect. His behavior is normal. Thought content " normal.       Procedure:      Discussion/Summary:    diverticulosis-increase fiber, stable  Elevated BP-cont lisinopril qhs, stable  Hyperlipidemia-labs on rtc, counseled on diet  Left calf strain/bruising-observe, HEP given, call if not better     Labs noted and dw patient       Current Outpatient Medications:   •  lisinopril (PRINIVIL,ZESTRIL) 10 MG tablet, take 1 tablet by mouth once daily, Disp: 90 tablet, Rfl: 3        Oleg was seen today for hyperlipidemia and hypertension.    Diagnoses and all orders for this visit:    Mixed hyperlipidemia    Essential hypertension    Sigmoid diverticulosis    Pain of left calf

## 2020-07-21 ENCOUNTER — OFFICE VISIT (OUTPATIENT)
Dept: INTERNAL MEDICINE | Facility: CLINIC | Age: 65
End: 2020-07-21

## 2020-07-21 VITALS
TEMPERATURE: 97.8 F | SYSTOLIC BLOOD PRESSURE: 124 MMHG | WEIGHT: 198 LBS | DIASTOLIC BLOOD PRESSURE: 80 MMHG | HEIGHT: 70 IN | RESPIRATION RATE: 20 BRPM | BODY MASS INDEX: 28.35 KG/M2 | HEART RATE: 82 BPM | OXYGEN SATURATION: 97 %

## 2020-07-21 DIAGNOSIS — M25.572 ACUTE LEFT ANKLE PAIN: ICD-10-CM

## 2020-07-21 DIAGNOSIS — M25.472 LEFT ANKLE SWELLING: Primary | ICD-10-CM

## 2020-07-21 DIAGNOSIS — M67.80 TENDON WEAKNESS: ICD-10-CM

## 2020-07-21 PROCEDURE — 99214 OFFICE O/P EST MOD 30 MIN: CPT | Performed by: NURSE PRACTITIONER

## 2020-07-21 NOTE — PROGRESS NOTES
Subjective   Chief Complaint   Patient presents with   • Ankle Pain      Oleg Mahoney is a 64 y.o. male here today for left ankle pain.  7 weeks ago patient was pushing a car and felt a sharp pain in the back of his left ankle and he and wife both heard a pop.  He immediately had some joint laxity and weakness of the tendon.  He had immediate swelling and bruising.  Bruising has resolved but he continues to have swelling.  He has an indention in the back of his heel where the Achilles tendon attaches to the calcaneal.  He is concerned about tendon rupture or tear.  Patient is able to ambulate but having significant discomfort.     I have reviewed the following portions of the patient's history and confirmed they are accurate: allergies, current medications, past family history, past medical history, past social history, past surgical history and problem list    I have personally completed the patient's review of systems.    Review of Systems   Constitutional: Negative for activity change, appetite change, chills, diaphoresis, fatigue, fever, unexpected weight gain and unexpected weight loss.   HENT: Negative for ear discharge, ear pain, mouth sores, nosebleeds, sinus pressure, sneezing and sore throat.    Eyes: Negative for pain, discharge and itching.   Respiratory: Negative for cough, chest tightness, shortness of breath and wheezing.    Cardiovascular: Negative for chest pain, palpitations and leg swelling.   Gastrointestinal: Negative for abdominal pain, constipation, diarrhea, nausea and vomiting.   Endocrine: Negative for heat intolerance, polydipsia and polyphagia.   Genitourinary: Negative for dysuria, flank pain, frequency, hematuria and urgency.   Musculoskeletal: Positive for arthralgias, gait problem and joint swelling. Negative for back pain, myalgias, neck pain and neck stiffness.   Skin: Negative for color change, pallor and rash.   Allergic/Immunologic: Negative for immunocompromised state.  "  Neurological: Negative for seizures, speech difficulty, weakness and numbness.   Hematological: Negative for adenopathy.   Psychiatric/Behavioral: Negative for agitation, decreased concentration, sleep disturbance, suicidal ideas and depressed mood. The patient is not nervous/anxious.        Current Outpatient Medications on File Prior to Visit   Medication Sig   • lisinopril (PRINIVIL,ZESTRIL) 10 MG tablet take 1 tablet by mouth once daily     No current facility-administered medications on file prior to visit.        Objective   Vitals:    07/21/20 1544   BP: 124/80   Pulse: 82   Resp: 20   Temp: 97.8 °F (36.6 °C)   TempSrc: Temporal   SpO2: 97%   Weight: 89.8 kg (198 lb)   Height: 177.8 cm (70\")     Body mass index is 28.41 kg/m².    Physical Exam   Constitutional: He is oriented to person, place, and time. He appears well-developed and well-nourished.   HENT:   Head: Normocephalic and atraumatic.   Nose: Nose normal.   Eyes: Pupils are equal, round, and reactive to light. Conjunctivae and lids are normal.   Neck: Trachea normal. No thyromegaly present.   Pulmonary/Chest: Effort normal. No respiratory distress.   Musculoskeletal:        Left ankle: He exhibits decreased range of motion and swelling. Achilles tendon exhibits pain and defect.   Decreased left ankle flexion. Indention of achilles tendon where attaches to calcaneal.    Neurological: He is alert and oriented to person, place, and time. He has normal strength. GCS eye subscore is 4. GCS verbal subscore is 5. GCS motor subscore is 6.   Skin: Skin is warm and dry.   Psychiatric: He has a normal mood and affect. His speech is normal and behavior is normal.   Vitals reviewed.      Assessment/Plan   Oleg was seen today for ankle pain.    Diagnoses and all orders for this visit:    Left ankle swelling  New onset issue unstable requiring further work up and monitoring. Will eat well balanced diet, increase water intake, increase physical activity as " tolerated, and get adequate rest. Can use warmth or ice for discomfort in this area. Discussed resting joint and can use supportive wrap.    -     MRI ankle left  wo contrast; Future    Acute left ankle pain  New onset issue unstable requiring further work up and monitoring. Will eat well balanced diet, increase water intake, increase physical activity as tolerated, and get adequate rest. Can use warmth or ice for discomfort in this area. Discussed resting joint and can use supportive wrap.    -     MRI ankle left  wo contrast; Future    Tendon weakness  New issue requiring further work-up and evaluation.  Discussed resting the foot and ankle and keeping elevated PRN.  -     MRI ankle left  wo contrast; Future           Current Outpatient Medications:   •  lisinopril (PRINIVIL,ZESTRIL) 10 MG tablet, take 1 tablet by mouth once daily, Disp: 90 tablet, Rfl: 3       Plan of care reviewed with the patient at the conclusion of today's visit.  Education was provided regarding diagnosis, management, and any prescribed or recommended OTC medications.  Patient verbalized understanding of and agreement with management plan.     No follow-ups on file.      Taina Tyson, DARIAN    Please note that portions of this note were completed with a voice recognition program. Efforts were made to edit the dictations, but occasionally words are mistranscribed.

## 2020-07-23 ENCOUNTER — TELEPHONE (OUTPATIENT)
Dept: INTERNAL MEDICINE | Facility: CLINIC | Age: 65
End: 2020-07-23

## 2020-07-23 NOTE — TELEPHONE ENCOUNTER
Patient requested to speak to the referral coordinator regarding the MRI referral. He spoke to Kathy with MRI scheduling and she can get him in as soon as 7/24/20 but she needs an authorization.     Attempted to warm transfer and was placed on hold.     Kathy told him that the office can contact her directly @ [phone] for the authorization and she can schedule him.

## 2020-07-30 ENCOUNTER — HOSPITAL ENCOUNTER (OUTPATIENT)
Dept: MRI IMAGING | Facility: HOSPITAL | Age: 65
Discharge: HOME | End: 2020-07-30
Admitting: NURSE PRACTITIONER

## 2020-07-30 DIAGNOSIS — M25.472 LEFT ANKLE SWELLING: ICD-10-CM

## 2020-07-30 DIAGNOSIS — M67.80 TENDON WEAKNESS: ICD-10-CM

## 2020-07-30 DIAGNOSIS — M25.572 ACUTE LEFT ANKLE PAIN: ICD-10-CM

## 2020-07-30 PROCEDURE — 73721 MRI JNT OF LWR EXTRE W/O DYE: CPT

## 2020-08-04 DIAGNOSIS — S86.012A ACHILLES TENDON TEAR, LEFT, INITIAL ENCOUNTER: Primary | ICD-10-CM

## 2020-08-11 ENCOUNTER — APPOINTMENT (OUTPATIENT)
Dept: MRI IMAGING | Facility: HOSPITAL | Age: 65
End: 2020-08-11

## 2020-10-26 ENCOUNTER — TELEPHONE (OUTPATIENT)
Dept: INTERNAL MEDICINE | Facility: CLINIC | Age: 65
End: 2020-10-26

## 2020-10-26 NOTE — TELEPHONE ENCOUNTER
PATIENT STATES HE IS SEVERELY CONSTIPATED, HAS NOT HAD BOWEL MOVEMENT IN ABOUT 4 DAYS. PATIENT HAS BEEN DIAGNOSED WITH DIVERTICULOSIS IN THE PAST.  LAXATIVE LAST Thursday BUT NOTHING SINCE THEN.     PATIENT WONDERS IS RECOMMENDED.     BEST CALL BACK [phone]

## 2020-11-01 DIAGNOSIS — I10 ESSENTIAL HYPERTENSION: ICD-10-CM

## 2020-11-02 RX ORDER — LISINOPRIL 10 MG/1
TABLET ORAL
Qty: 90 TABLET | Refills: 3 | Status: SHIPPED | OUTPATIENT
Start: 2020-11-02 | End: 2021-09-13

## 2020-11-18 ENCOUNTER — OFFICE VISIT (OUTPATIENT)
Dept: INTERNAL MEDICINE | Facility: CLINIC | Age: 65
End: 2020-11-18

## 2020-11-18 VITALS
DIASTOLIC BLOOD PRESSURE: 74 MMHG | BODY MASS INDEX: 28.2 KG/M2 | SYSTOLIC BLOOD PRESSURE: 120 MMHG | HEIGHT: 70 IN | TEMPERATURE: 97.3 F | WEIGHT: 197 LBS | HEART RATE: 68 BPM

## 2020-11-18 DIAGNOSIS — Z00.00 ROUTINE GENERAL MEDICAL EXAMINATION AT A HEALTH CARE FACILITY: ICD-10-CM

## 2020-11-18 DIAGNOSIS — I10 ESSENTIAL HYPERTENSION: ICD-10-CM

## 2020-11-18 DIAGNOSIS — E78.49 OTHER HYPERLIPIDEMIA: Primary | ICD-10-CM

## 2020-11-18 DIAGNOSIS — K57.30 SIGMOID DIVERTICULOSIS: ICD-10-CM

## 2020-11-18 PROCEDURE — 99397 PER PM REEVAL EST PAT 65+ YR: CPT | Performed by: INTERNAL MEDICINE

## 2020-11-18 NOTE — PROGRESS NOTES
Patient is a 65 y.o. male who is here for a physical.  Chief Complaint   Patient presents with   • Annual Exam         HPI:    Here for CPE.    History:     Patient Active Problem List   Diagnosis   • Essential hypertension   • Headache   • Routine general medical examination at a health care facility   • Sigmoid diverticulosis   • Other hyperlipidemia   • Pain of left calf       Past Medical History:   Diagnosis Date   • Encounter for preventive health examination        No past surgical history on file.    Current Outpatient Medications on File Prior to Visit   Medication Sig   • lisinopril (PRINIVIL,ZESTRIL) 10 MG tablet TAKE 1 TABLET BY MOUTH ONCE DAILY     No current facility-administered medications on file prior to visit.        Family History   Problem Relation Age of Onset   • Hypertension Mother    • Stroke Father         CVA   • Hypertension Father    • Diabetes Sister    • Coronary artery disease Other        Social History     Socioeconomic History   • Marital status:      Spouse name: Not on file   • Number of children: Not on file   • Years of education: Not on file   • Highest education level: Not on file   Tobacco Use   • Smoking status: Never Smoker         Review of Systems   Constitutional: Negative for chills, fatigue, fever and unexpected weight change.   HENT: Negative for congestion, ear pain, hearing loss, rhinorrhea, sinus pressure, sore throat and trouble swallowing.    Eyes: Negative for discharge and itching.   Respiratory: Negative for cough, chest tightness and shortness of breath.    Cardiovascular: Negative for chest pain, palpitations and leg swelling.   Gastrointestinal: Negative for abdominal pain, blood in stool, constipation, diarrhea and vomiting.        11/20 colonoscopy with 3 mm polyp   Endocrine: Negative for polydipsia and polyuria.   Genitourinary: Negative for difficulty urinating, dysuria, enuresis, frequency, hematuria and urgency.        11/20 EARLINE/psa  "  Musculoskeletal: Positive for arthralgias and gait problem. Negative for back pain.   Skin: Negative for rash and wound.   Allergic/Immunologic: Negative for immunocompromised state.   Neurological: Negative for dizziness, syncope, weakness, light-headedness, numbness and headaches.   Hematological: Does not bruise/bleed easily.   Psychiatric/Behavioral: Negative for behavioral problems, dysphoric mood and sleep disturbance. The patient is not nervous/anxious.        /74   Pulse 68   Temp 97.3 °F (36.3 °C) (Infrared)   Ht 177.8 cm (70\")   Wt 89.4 kg (197 lb)   BMI 28.27 kg/m²       Physical Exam  Constitutional:       Appearance: Normal appearance. He is well-developed.   HENT:      Head: Normocephalic and atraumatic.      Right Ear: External ear normal.      Left Ear: External ear normal.      Nose: Nose normal.      Mouth/Throat:      Mouth: Mucous membranes are moist.      Pharynx: Oropharynx is clear.   Eyes:      Extraocular Movements: Extraocular movements intact.      Conjunctiva/sclera: Conjunctivae normal.      Pupils: Pupils are equal, round, and reactive to light.   Neck:      Musculoskeletal: Normal range of motion and neck supple.   Cardiovascular:      Rate and Rhythm: Normal rate and regular rhythm.      Pulses: Normal pulses.      Heart sounds: Normal heart sounds.   Pulmonary:      Effort: Pulmonary effort is normal.      Breath sounds: Normal breath sounds.   Abdominal:      General: Bowel sounds are normal.      Palpations: Abdomen is soft.   Musculoskeletal: Normal range of motion.   Lymphadenopathy:      Cervical: No cervical adenopathy.   Skin:     General: Skin is warm and dry.   Neurological:      General: No focal deficit present.      Mental Status: He is alert and oriented to person, place, and time.   Psychiatric:         Mood and Affect: Mood normal.         Behavior: Behavior normal.         Thought Content: Thought content normal. "         Procedure:      Discussion/Summary:      HME-counseled on diet and exercise, PSA soon  diverticulosis-increase fiber, asymptomatic  Hyperlipidemia-counseled on diet and exercise, fasting labs soon  HTN-cont lisinopril qhs, counseled on low Na and to avoid NSAIDS      11/18 Labs noted and dw patient, counseled on diet     Reviewed the following with the patient: advised patient to avoid alcoholic beverages and encouraged patient to exercise 5-7 days per week for 30 minutes at a time.    Current Outpatient Medications:   •  lisinopril (PRINIVIL,ZESTRIL) 10 MG tablet, TAKE 1 TABLET BY MOUTH ONCE DAILY, Disp: 90 tablet, Rfl: 3        Diagnoses and all orders for this visit:    1. Other hyperlipidemia (Primary)    2. Essential hypertension    3. Sigmoid diverticulosis    4. Routine general medical examination at a health care facility

## 2020-11-24 ENCOUNTER — TELEPHONE (OUTPATIENT)
Dept: INTERNAL MEDICINE | Facility: CLINIC | Age: 65
End: 2020-11-24

## 2020-11-24 DIAGNOSIS — E78.2 MIXED HYPERLIPIDEMIA: Primary | ICD-10-CM

## 2020-11-24 NOTE — TELEPHONE ENCOUNTER
Caller: Oleg Mahoney    Relationship: Self    Best call back number:[phone]    What orders are you requesting (i.e. lab or imaging): LIPID PANEL ORDERS    In what timeframe would the patient need to come in: ASAP    Where will you receive your lab/imaging services: IN OFFICE    Additional notes: PATIENT STATED AT LAST APPOINTMENT DR SHEPHERD WANTED THIS DONE SINCE HE WASN'T FASTING ON DAY OF APPOINTMENT. PLEASE CALL PATIENT WHEN ORDERS ARE IN.

## 2021-09-11 DIAGNOSIS — I10 ESSENTIAL HYPERTENSION: ICD-10-CM

## 2021-09-13 RX ORDER — LISINOPRIL 10 MG/1
TABLET ORAL
Qty: 90 TABLET | Refills: 3 | Status: SHIPPED | OUTPATIENT
Start: 2021-09-13 | End: 2022-11-04

## 2021-12-09 ENCOUNTER — OFFICE VISIT (OUTPATIENT)
Dept: INTERNAL MEDICINE | Facility: CLINIC | Age: 66
End: 2021-12-09

## 2021-12-09 ENCOUNTER — LAB (OUTPATIENT)
Dept: LAB | Facility: HOSPITAL | Age: 66
End: 2021-12-09

## 2021-12-09 VITALS
WEIGHT: 191 LBS | TEMPERATURE: 96.9 F | HEIGHT: 70 IN | DIASTOLIC BLOOD PRESSURE: 70 MMHG | SYSTOLIC BLOOD PRESSURE: 110 MMHG | HEART RATE: 72 BPM | BODY MASS INDEX: 27.35 KG/M2

## 2021-12-09 DIAGNOSIS — Z00.00 ROUTINE GENERAL MEDICAL EXAMINATION AT A HEALTH CARE FACILITY: ICD-10-CM

## 2021-12-09 DIAGNOSIS — Z00.00 ENCOUNTER FOR MEDICARE ANNUAL WELLNESS EXAM: ICD-10-CM

## 2021-12-09 DIAGNOSIS — E78.49 OTHER HYPERLIPIDEMIA: ICD-10-CM

## 2021-12-09 DIAGNOSIS — E78.49 OTHER HYPERLIPIDEMIA: Primary | ICD-10-CM

## 2021-12-09 DIAGNOSIS — Z12.5 SCREENING FOR MALIGNANT NEOPLASM OF PROSTATE: ICD-10-CM

## 2021-12-09 DIAGNOSIS — I10 ESSENTIAL HYPERTENSION: Primary | ICD-10-CM

## 2021-12-09 DIAGNOSIS — I10 ESSENTIAL HYPERTENSION: ICD-10-CM

## 2021-12-09 DIAGNOSIS — K57.30 SIGMOID DIVERTICULOSIS: ICD-10-CM

## 2021-12-09 DIAGNOSIS — Z12.11 SCREEN FOR COLON CANCER: ICD-10-CM

## 2021-12-09 LAB
ALBUMIN SERPL-MCNC: 4.4 G/DL (ref 3.5–5.2)
ALBUMIN/GLOB SERPL: 1.6 G/DL
ALP SERPL-CCNC: 57 U/L (ref 39–117)
ALT SERPL W P-5'-P-CCNC: 20 U/L (ref 1–41)
ANION GAP SERPL CALCULATED.3IONS-SCNC: 9.9 MMOL/L (ref 5–15)
AST SERPL-CCNC: 24 U/L (ref 1–40)
BILIRUB SERPL-MCNC: 0.7 MG/DL (ref 0–1.2)
BUN SERPL-MCNC: 13 MG/DL (ref 8–23)
BUN/CREAT SERPL: 14.3 (ref 7–25)
CALCIUM SPEC-SCNC: 9 MG/DL (ref 8.6–10.5)
CHLORIDE SERPL-SCNC: 101 MMOL/L (ref 98–107)
CHOLEST SERPL-MCNC: 201 MG/DL (ref 0–200)
CO2 SERPL-SCNC: 26.1 MMOL/L (ref 22–29)
CREAT SERPL-MCNC: 0.91 MG/DL (ref 0.76–1.27)
DEPRECATED RDW RBC AUTO: 41.6 FL (ref 37–54)
DEVELOPER EXPIRATION DATE: NORMAL
DEVELOPER LOT NUMBER: NORMAL
ERYTHROCYTE [DISTWIDTH] IN BLOOD BY AUTOMATED COUNT: 12.7 % (ref 12.3–15.4)
EXPIRATION DATE: NORMAL
FECAL OCCULT BLOOD SCREEN, POC: NEGATIVE
GFR SERPL CREATININE-BSD FRML MDRD: 83 ML/MIN/1.73
GLOBULIN UR ELPH-MCNC: 2.7 GM/DL
GLUCOSE SERPL-MCNC: 92 MG/DL (ref 65–99)
HCT VFR BLD AUTO: 44.1 % (ref 37.5–51)
HDLC SERPL-MCNC: 48 MG/DL (ref 40–60)
HGB BLD-MCNC: 15.3 G/DL (ref 13–17.7)
LDLC SERPL CALC-MCNC: 133 MG/DL (ref 0–100)
LDLC/HDLC SERPL: 2.72 {RATIO}
Lab: NORMAL
MCH RBC QN AUTO: 31 PG (ref 26.6–33)
MCHC RBC AUTO-ENTMCNC: 34.7 G/DL (ref 31.5–35.7)
MCV RBC AUTO: 89.5 FL (ref 79–97)
NEGATIVE CONTROL: NEGATIVE
PLATELET # BLD AUTO: 238 10*3/MM3 (ref 140–450)
PMV BLD AUTO: 9.1 FL (ref 6–12)
POSITIVE CONTROL: POSITIVE
POTASSIUM SERPL-SCNC: 4.4 MMOL/L (ref 3.5–5.2)
PROT SERPL-MCNC: 7.1 G/DL (ref 6–8.5)
PSA SERPL-MCNC: 0.52 NG/ML (ref 0–4)
RBC # BLD AUTO: 4.93 10*6/MM3 (ref 4.14–5.8)
SODIUM SERPL-SCNC: 137 MMOL/L (ref 136–145)
TRIGL SERPL-MCNC: 112 MG/DL (ref 0–150)
TSH SERPL DL<=0.05 MIU/L-ACNC: 2.51 UIU/ML (ref 0.27–4.2)
VIT B12 BLD-MCNC: 406 PG/ML (ref 211–946)
VLDLC SERPL-MCNC: 20 MG/DL (ref 5–40)
WBC NRBC COR # BLD: 4.67 10*3/MM3 (ref 3.4–10.8)

## 2021-12-09 PROCEDURE — 1170F FXNL STATUS ASSESSED: CPT | Performed by: INTERNAL MEDICINE

## 2021-12-09 PROCEDURE — 85027 COMPLETE CBC AUTOMATED: CPT

## 2021-12-09 PROCEDURE — 82607 VITAMIN B-12: CPT

## 2021-12-09 PROCEDURE — 36415 COLL VENOUS BLD VENIPUNCTURE: CPT

## 2021-12-09 PROCEDURE — G0103 PSA SCREENING: HCPCS

## 2021-12-09 PROCEDURE — 1160F RVW MEDS BY RX/DR IN RCRD: CPT | Performed by: INTERNAL MEDICINE

## 2021-12-09 PROCEDURE — 82270 OCCULT BLOOD FECES: CPT | Performed by: INTERNAL MEDICINE

## 2021-12-09 PROCEDURE — G0439 PPPS, SUBSEQ VISIT: HCPCS | Performed by: INTERNAL MEDICINE

## 2021-12-09 PROCEDURE — 80053 COMPREHEN METABOLIC PANEL: CPT

## 2021-12-09 PROCEDURE — 96160 PT-FOCUSED HLTH RISK ASSMT: CPT | Performed by: INTERNAL MEDICINE

## 2021-12-09 PROCEDURE — 80061 LIPID PANEL: CPT

## 2021-12-09 PROCEDURE — 84443 ASSAY THYROID STIM HORMONE: CPT

## 2021-12-09 PROCEDURE — 1126F AMNT PAIN NOTED NONE PRSNT: CPT | Performed by: INTERNAL MEDICINE

## 2021-12-09 NOTE — PROGRESS NOTES
The ABCs of the Annual Wellness Visit  Subsequent Medicare Wellness Visit    Chief Complaint   Patient presents with   • Annual Exam      Subjective       History of Present Illness:  Oleg Mahoney is a 66 y.o. male who presents for a Subsequent Medicare Wellness Visit.    The following portions of the patient's history were reviewed and   updated as appropriate: current medications, past family history, past medical history, past social history, past surgical history and problem list.       Compared to one year ago, the patient feels his physical   health is the same.    Compared to one year ago, the patient feels his mental   health is the same.    Recent Hospitalizations:  He was not admitted to the hospital during the last year.       Current Medical Providers:  Patient Care Team:  Enrico Reddy MD as PCP - General    Outpatient Medications Prior to Visit   Medication Sig Dispense Refill   • lisinopril (PRINIVIL,ZESTRIL) 10 MG tablet TAKE 1 TABLET BY MOUTH ONCE DAILY 90 tablet 3     No facility-administered medications prior to visit.       No opioid medication identified on active medication list. I have reviewed chart for other potential  high risk medication/s and harmful drug interactions in the elderly.          Aspirin is not on active medication list.  Aspirin use is not indicated based on review of current medical condition/s. Risk of harm outweighs potential benefits.  .      Fall Risk Assessment was completed, and patient is at low risk for falls.      Patient Active Problem List   Diagnosis   • Essential hypertension   • Headache   • Routine general medical examination at a health care facility   • Sigmoid diverticulosis   • Other hyperlipidemia   • Pain of left calf     Advance Care Planning   Advance Directive is not on file.  ACP discussion was held with the patient during this visit. Patient does not have an advance directive, information provided.    Review of Systems   Constitutional:  "Negative for chills, fatigue, fever and unexpected weight change.   HENT: Negative for congestion, ear pain, hearing loss, rhinorrhea, sinus pressure, sore throat and trouble swallowing.    Eyes: Negative for discharge and itching.   Respiratory: Negative for cough, chest tightness and shortness of breath.    Cardiovascular: Negative for chest pain, palpitations and leg swelling.   Gastrointestinal: Negative for abdominal pain, blood in stool, constipation, diarrhea and vomiting.        11/20 colonoscopy with 3 mm polyp   Endocrine: Negative for polydipsia and polyuria.   Genitourinary: Negative for difficulty urinating, dysuria, enuresis, frequency, hematuria and urgency.        12/21 EARLINE/psa   Musculoskeletal: Positive for arthralgias. Negative for back pain.   Skin: Negative for rash and wound.   Allergic/Immunologic: Negative for immunocompromised state.   Neurological: Negative for dizziness, syncope, weakness, light-headedness, numbness and headaches.   Hematological: Does not bruise/bleed easily.   Psychiatric/Behavioral: Negative for behavioral problems, dysphoric mood and sleep disturbance. The patient is not nervous/anxious.          Objective       Vitals:    12/09/21 1438 12/09/21 1450   BP: 130/78 110/70   BP Location: Left arm    Patient Position: Sitting    Pulse: 72    Temp: 96.9 °F (36.1 °C)    TempSrc: Infrared    Weight: 86.6 kg (191 lb)    Height: 177.8 cm (70\")    PainSc: 0-No pain      Body mass index is 27.41 kg/m².  BMI has not been calculated during today's encounter.     Does the patient have evidence of cognitive impairment? No    Physical Exam  Constitutional:       Appearance: Normal appearance. He is well-developed.   HENT:      Head: Normocephalic and atraumatic.      Right Ear: External ear normal.      Left Ear: External ear normal.      Nose: Nose normal.      Mouth/Throat:      Mouth: Mucous membranes are moist.      Pharynx: Oropharynx is clear.   Eyes:      Extraocular Movements: " Extraocular movements intact.      Conjunctiva/sclera: Conjunctivae normal.      Pupils: Pupils are equal, round, and reactive to light.   Cardiovascular:      Rate and Rhythm: Normal rate and regular rhythm.      Pulses: Normal pulses.      Heart sounds: Normal heart sounds.   Pulmonary:      Effort: Pulmonary effort is normal.      Breath sounds: Normal breath sounds.   Abdominal:      General: Bowel sounds are normal.      Palpations: Abdomen is soft.   Genitourinary:     Prostate: Normal.      Rectum: Normal.   Musculoskeletal:         General: Normal range of motion.      Cervical back: Normal range of motion and neck supple.   Lymphadenopathy:      Cervical: No cervical adenopathy.   Skin:     General: Skin is warm and dry.   Neurological:      General: No focal deficit present.      Mental Status: He is alert and oriented to person, place, and time.   Psychiatric:         Mood and Affect: Mood normal.         Behavior: Behavior normal.         Thought Content: Thought content normal.                 HEALTH RISK ASSESSMENT    Smoking Status:  Social History     Tobacco Use   Smoking Status Never Smoker   Smokeless Tobacco Not on file     Alcohol Consumption:  Social History     Substance and Sexual Activity   Alcohol Use None     Fall Risk Screen:    [facility] Fall Risk Assessment was completed, and patient is at LOW risk for falls.Assessment completed on:12/9/2021    Depression Screening:  PHQ-2/PHQ-9 Depression Screening 12/9/2021   Little interest or pleasure in doing things 0   Feeling down, depressed, or hopeless 0   Trouble falling or staying asleep, or sleeping too much 0   Feeling tired or having little energy 0   Poor appetite or overeating 0   Feeling bad about yourself - or that you are a failure or have let yourself or your family down 0   Trouble concentrating on things, such as reading the newspaper or watching television 0   Moving or speaking so slowly that other people could have noticed. Or the  opposite - being so fidgety or restless that you have been moving around a lot more than usual 0   Thoughts that you would be better off dead, or of hurting yourself in some way 0   Total Score 0       Health Habits and Functional and Cognitive Screening:  Functional & Cognitive Status 12/9/2021   Do you have difficulty preparing food and eating? No   Do you have difficulty bathing yourself, getting dressed or grooming yourself? No   Do you have difficulty using the toilet? No   Do you have difficulty moving around from place to place? No   Do you have trouble with steps or getting out of a bed or a chair? No   Current Diet Well Balanced Diet   Dental Exam Not up to date   Eye Exam Not up to date   Exercise (times per week) 2 times per week   Current Exercises Include Walking   Do you need help using the phone?  No   Are you deaf or do you have serious difficulty hearing?  No   Do you need help with transportation? No   Do you need help shopping? No   Do you need help preparing meals?  No   Do you need help with housework?  No   Do you need help with laundry? No   Do you need help taking your medications? No   Do you need help managing money? No   Do you ever drive or ride in a car without wearing a seat belt? No   Have you felt unusual stress, anger or loneliness in the last month? No   Who do you live with? Spouse   If you need help, do you have trouble finding someone available to you? No   Have you been bothered in the last four weeks by sexual problems? No   Do you have difficulty concentrating, remembering or making decisions? No       Age-appropriate Screening Schedule:  Refer to the list below for future screening recommendations based on patient's age, sex and/or medical conditions. Orders for these recommended tests are listed in the plan section. The patient has been provided with a written plan.    Health Maintenance   Topic Date Due   • TDAP/TD VACCINES (1 - Tdap) Never done   • ZOSTER VACCINE (1 of 2)  Never done   • LIPID PANEL  02/21/2020   • INFLUENZA VACCINE  Never done              Assessment/Plan        CMS Preventative Services Quick Reference  Risk Factors Identified During Encounter  Immunizations Discussed/Encouraged (specific Immunizations; Td, Hepatitis A Vaccine/Series, Influenza, Pneumococcal 23 and Shingrix  The above risks/problems have been discussed with the patient.  Follow up actions/plans if indicated are seen below in the Assessment/Plan Section.  Pertinent information has been shared with the patient in the After Visit Summary.    Diagnoses and all orders for this visit:    1. Other hyperlipidemia (Primary)    2. Essential hypertension    3. Sigmoid diverticulosis    4. Routine general medical examination at a health care facility  -     POC Occult Blood Stool    5. Encounter for Medicare annual wellness exam  -     POC Occult Blood Stool    6. Screen for colon cancer  -     POC Occult Blood Stool        Follow Up:   No follow-ups on file.     An After Visit Summary and PPPS were given to the patient.                 HME-counseled on diet and exercise  diverticulosis-increase fiber, asymptomatic  Hyperlipidemia-counseled on diet and exercise, fasting labs today  HTN-cont lisinopril qhs, counseled on low Na and to avoid NSAIDS      12/9 Labs noted and dw patient, counseled on diet      Reviewed the following with the patient: advised patient to avoid alcoholic beverages and encouraged patient to exercise 5-7 days per week for 30 minutes at a time.

## 2022-05-31 ENCOUNTER — HOSPITAL ENCOUNTER (OUTPATIENT)
Dept: GENERAL RADIOLOGY | Facility: HOSPITAL | Age: 67
Discharge: HOME | End: 2022-05-31
Admitting: INTERNAL MEDICINE

## 2022-05-31 ENCOUNTER — OFFICE VISIT (OUTPATIENT)
Dept: INTERNAL MEDICINE | Facility: CLINIC | Age: 67
End: 2022-05-31

## 2022-05-31 VITALS
HEART RATE: 86 BPM | OXYGEN SATURATION: 98 % | TEMPERATURE: 98.3 F | HEIGHT: 70 IN | SYSTOLIC BLOOD PRESSURE: 124 MMHG | WEIGHT: 192 LBS | DIASTOLIC BLOOD PRESSURE: 80 MMHG | RESPIRATION RATE: 14 BRPM | BODY MASS INDEX: 27.49 KG/M2

## 2022-05-31 DIAGNOSIS — M25.511 ACUTE PAIN OF RIGHT SHOULDER: Primary | ICD-10-CM

## 2022-05-31 DIAGNOSIS — M25.511 ACUTE PAIN OF RIGHT SHOULDER: ICD-10-CM

## 2022-05-31 PROCEDURE — 99213 OFFICE O/P EST LOW 20 MIN: CPT | Performed by: INTERNAL MEDICINE

## 2022-05-31 PROCEDURE — 73030 X-RAY EXAM OF SHOULDER: CPT

## 2022-06-07 NOTE — PROGRESS NOTES
"     Office Note      Date: 2022  Patient Name: Oleg Mahoney  MRN: [id number]  : 1955    Chief Complaint   Patient presents with   • Shoulder Pain       History of Present Illness: Oleg Mahoney is a 66 y.o. male who presents for Shoulder Pain.  Having right shoulder pain past few days, pain located anterior shoulder.  Worse with movement.    Subjective      Review of Systems:   Pertinent review of systems per HPI.    Review of Systems   Constitutional: Negative for activity change, appetite change, chills, diaphoresis and fatigue.   HENT: Negative for congestion, dental problem, drooling, ear discharge, ear pain, facial swelling and hearing loss.    Eyes: Negative for photophobia, pain, discharge, redness, itching and visual disturbance.   Respiratory: Negative for cough, choking, chest tightness and shortness of breath.    Cardiovascular: Negative for chest pain, palpitations and leg swelling.   Endocrine: Negative for cold intolerance, heat intolerance, polydipsia and polyuria.   Genitourinary: Negative for difficulty urinating, dysuria, flank pain, frequency and hematuria.   Musculoskeletal: Positive for arthralgias. Negative for back pain.   Skin: Negative for color change, pallor, rash and wound.   Allergic/Immunologic: Negative for environmental allergies.   Neurological: Negative for dizziness, facial asymmetry, light-headedness and headaches.   Psychiatric/Behavioral: Negative.    All other systems reviewed and are negative.    No Known Allergies    Objective     Physical Exam:  Vital Signs:   Vitals:    22 1151   BP: 124/80   Pulse: 86   Resp: 14   Temp: 98.3 °F (36.8 °C)   TempSrc: Temporal   SpO2: 98%   Weight: 87.1 kg (192 lb)   Height: 177.8 cm (70\")      Body mass index is 27.55 kg/m².    Physical Exam  Vitals and nursing note reviewed.   Constitutional:       General: He is not in acute distress.     Appearance: He is well-developed.   HENT:      Head: Normocephalic and " atraumatic.      Right Ear: External ear normal.      Left Ear: External ear normal.   Eyes:      General: No scleral icterus.        Right eye: No discharge.         Left eye: No discharge.      Conjunctiva/sclera: Conjunctivae normal.   Cardiovascular:      Rate and Rhythm: Normal rate and regular rhythm.      Heart sounds: Normal heart sounds. No murmur heard.    No friction rub. No gallop.   Pulmonary:      Effort: Pulmonary effort is normal. No respiratory distress.      Breath sounds: Normal breath sounds. No wheezing or rales.   Musculoskeletal:      Comments: Full range of bilateral shoulders.   Skin:     General: Skin is warm and dry.      Coloration: Skin is not pale.         Assessment / Plan      Assessment & Plan:    1. Acute pain of right shoulder  Will get x-ray and referral to Ortho for shoulder injection  - XR Shoulder 2+ View Right; Future  - Ambulatory Referral to Orthopedic Surgery      Nisreen Oleary MD  05/31/2022

## 2022-06-08 DIAGNOSIS — M25.511 ACUTE PAIN OF RIGHT SHOULDER: Primary | ICD-10-CM

## 2022-06-09 ENCOUNTER — HOSPITAL ENCOUNTER (OUTPATIENT)
Dept: MRI IMAGING | Facility: HOSPITAL | Age: 67
Discharge: HOME | End: 2022-06-09
Admitting: INTERNAL MEDICINE

## 2022-06-09 PROCEDURE — 73221 MRI JOINT UPR EXTREM W/O DYE: CPT

## 2022-11-04 DIAGNOSIS — I10 ESSENTIAL HYPERTENSION: ICD-10-CM

## 2022-11-04 RX ORDER — LISINOPRIL 10 MG/1
TABLET ORAL
Qty: 90 TABLET | Refills: 3 | Status: SHIPPED | OUTPATIENT
Start: 2022-11-04

## 2023-08-14 DIAGNOSIS — I10 ESSENTIAL HYPERTENSION: ICD-10-CM

## 2023-08-14 RX ORDER — LISINOPRIL 10 MG/1
10 TABLET ORAL DAILY
Qty: 90 TABLET | Refills: 3 | Status: SHIPPED | OUTPATIENT
Start: 2023-08-14

## 2023-10-19 RX ORDER — AZITHROMYCIN 250 MG/1
TABLET, FILM COATED ORAL
Qty: 6 TABLET | Refills: 0 | Status: SHIPPED | OUTPATIENT
Start: 2023-10-19

## 2023-11-22 DIAGNOSIS — I10 ESSENTIAL HYPERTENSION: ICD-10-CM

## 2023-11-22 RX ORDER — LISINOPRIL 10 MG/1
10 TABLET ORAL DAILY
Qty: 90 TABLET | Refills: 3 | Status: SHIPPED | OUTPATIENT
Start: 2023-11-22

## 2024-02-19 DIAGNOSIS — I10 ESSENTIAL HYPERTENSION: ICD-10-CM

## 2024-02-19 RX ORDER — LISINOPRIL 10 MG/1
10 TABLET ORAL DAILY
Qty: 90 TABLET | Refills: 3 | Status: SHIPPED | OUTPATIENT
Start: 2024-02-19

## 2024-06-19 DIAGNOSIS — Z12.5 SCREENING FOR PROSTATE CANCER: ICD-10-CM

## 2024-06-19 DIAGNOSIS — R73.09 ABNORMAL GLUCOSE: ICD-10-CM

## 2024-06-19 DIAGNOSIS — Z00.00 ENCOUNTER FOR MEDICARE ANNUAL WELLNESS EXAM: Primary | ICD-10-CM

## 2024-06-21 ENCOUNTER — LAB (OUTPATIENT)
Dept: INTERNAL MEDICINE | Facility: CLINIC | Age: 69
End: 2024-06-21
Payer: MEDICARE

## 2024-06-21 DIAGNOSIS — E78.49 OTHER HYPERLIPIDEMIA: Primary | ICD-10-CM

## 2024-06-21 LAB
ALBUMIN SERPL-MCNC: 4.3 G/DL (ref 3.5–5.2)
ALBUMIN/GLOB SERPL: 2 G/DL
ALP SERPL-CCNC: 55 U/L (ref 39–117)
ALT SERPL W P-5'-P-CCNC: 22 U/L (ref 1–41)
ANION GAP SERPL CALCULATED.3IONS-SCNC: 10.2 MMOL/L (ref 5–15)
AST SERPL-CCNC: 25 U/L (ref 1–40)
BILIRUB SERPL-MCNC: 0.6 MG/DL (ref 0–1.2)
BILIRUB UR QL STRIP: NEGATIVE
BUN SERPL-MCNC: 9 MG/DL (ref 8–23)
BUN/CREAT SERPL: 9.7 (ref 7–25)
CALCIUM SPEC-SCNC: 8.7 MG/DL (ref 8.6–10.5)
CHLORIDE SERPL-SCNC: 102 MMOL/L (ref 98–107)
CHOLEST SERPL-MCNC: 183 MG/DL (ref 0–200)
CLARITY UR: CLEAR
CO2 SERPL-SCNC: 24.8 MMOL/L (ref 22–29)
COLOR UR: YELLOW
CREAT SERPL-MCNC: 0.93 MG/DL (ref 0.76–1.27)
DEPRECATED RDW RBC AUTO: 39.3 FL (ref 37–54)
EGFRCR SERPLBLD CKD-EPI 2021: 88.9 ML/MIN/1.73
ERYTHROCYTE [DISTWIDTH] IN BLOOD BY AUTOMATED COUNT: 12.1 % (ref 12.3–15.4)
GLOBULIN UR ELPH-MCNC: 2.1 GM/DL
GLUCOSE SERPL-MCNC: 96 MG/DL (ref 65–99)
GLUCOSE UR STRIP-MCNC: NEGATIVE MG/DL
HBA1C MFR BLD: 5.2 % (ref 4.8–5.6)
HCT VFR BLD AUTO: 44.5 % (ref 37.5–51)
HDLC SERPL-MCNC: 58 MG/DL (ref 40–60)
HGB BLD-MCNC: 15.4 G/DL (ref 13–17.7)
HGB UR QL STRIP.AUTO: NEGATIVE
KETONES UR QL STRIP: NEGATIVE
LDLC SERPL CALC-MCNC: 112 MG/DL (ref 0–100)
LDLC/HDLC SERPL: 1.91 {RATIO}
LEUKOCYTE ESTERASE UR QL STRIP.AUTO: NEGATIVE
MCH RBC QN AUTO: 31.2 PG (ref 26.6–33)
MCHC RBC AUTO-ENTMCNC: 34.6 G/DL (ref 31.5–35.7)
MCV RBC AUTO: 90.1 FL (ref 79–97)
NITRITE UR QL STRIP: NEGATIVE
PH UR STRIP.AUTO: 7.5 [PH] (ref 5–8)
PLATELET # BLD AUTO: 207 10*3/MM3 (ref 140–450)
PMV BLD AUTO: 9.5 FL (ref 6–12)
POTASSIUM SERPL-SCNC: 4.8 MMOL/L (ref 3.5–5.2)
PROT SERPL-MCNC: 6.4 G/DL (ref 6–8.5)
PROT UR QL STRIP: NEGATIVE
PSA SERPL-MCNC: 0.53 NG/ML (ref 0–4)
RBC # BLD AUTO: 4.94 10*6/MM3 (ref 4.14–5.8)
SODIUM SERPL-SCNC: 137 MMOL/L (ref 136–145)
SP GR UR STRIP: 1.01 (ref 1–1.03)
TESTOST SERPL-MCNC: 292 NG/DL (ref 193–740)
TRIGL SERPL-MCNC: 70 MG/DL (ref 0–150)
TSH SERPL DL<=0.05 MIU/L-ACNC: 2.83 UIU/ML (ref 0.27–4.2)
UROBILINOGEN UR QL STRIP: NORMAL
VLDLC SERPL-MCNC: 13 MG/DL (ref 5–40)
WBC NRBC COR # BLD AUTO: 4.83 10*3/MM3 (ref 3.4–10.8)

## 2024-06-21 PROCEDURE — 80061 LIPID PANEL: CPT | Performed by: INTERNAL MEDICINE

## 2024-06-21 PROCEDURE — 36415 COLL VENOUS BLD VENIPUNCTURE: CPT | Performed by: INTERNAL MEDICINE

## 2024-06-21 PROCEDURE — 84443 ASSAY THYROID STIM HORMONE: CPT | Performed by: INTERNAL MEDICINE

## 2024-06-21 PROCEDURE — 84403 ASSAY OF TOTAL TESTOSTERONE: CPT | Performed by: INTERNAL MEDICINE

## 2024-06-21 PROCEDURE — 80053 COMPREHEN METABOLIC PANEL: CPT | Performed by: INTERNAL MEDICINE

## 2024-06-21 PROCEDURE — 83036 HEMOGLOBIN GLYCOSYLATED A1C: CPT | Performed by: INTERNAL MEDICINE

## 2024-06-21 PROCEDURE — 81003 URINALYSIS AUTO W/O SCOPE: CPT | Performed by: INTERNAL MEDICINE

## 2024-06-21 PROCEDURE — 82607 VITAMIN B-12: CPT | Performed by: INTERNAL MEDICINE

## 2024-06-21 PROCEDURE — G0103 PSA SCREENING: HCPCS | Performed by: INTERNAL MEDICINE

## 2024-06-21 PROCEDURE — 85027 COMPLETE CBC AUTOMATED: CPT | Performed by: INTERNAL MEDICINE

## 2024-06-22 ENCOUNTER — OFFICE VISIT (OUTPATIENT)
Dept: INTERNAL MEDICINE | Facility: CLINIC | Age: 69
End: 2024-06-22
Payer: MEDICARE

## 2024-06-22 VITALS
WEIGHT: 191.2 LBS | HEIGHT: 70 IN | TEMPERATURE: 97.1 F | SYSTOLIC BLOOD PRESSURE: 116 MMHG | OXYGEN SATURATION: 95 % | DIASTOLIC BLOOD PRESSURE: 74 MMHG | HEART RATE: 75 BPM | BODY MASS INDEX: 27.37 KG/M2

## 2024-06-22 DIAGNOSIS — I10 ESSENTIAL HYPERTENSION: ICD-10-CM

## 2024-06-22 DIAGNOSIS — E78.2 MIXED HYPERLIPIDEMIA: ICD-10-CM

## 2024-06-22 DIAGNOSIS — K57.30 SIGMOID DIVERTICULOSIS: ICD-10-CM

## 2024-06-22 DIAGNOSIS — Z00.00 ROUTINE GENERAL MEDICAL EXAMINATION AT A HEALTH CARE FACILITY: Primary | ICD-10-CM

## 2024-06-22 DIAGNOSIS — Z00.00 ENCOUNTER FOR MEDICARE ANNUAL WELLNESS EXAM: ICD-10-CM

## 2024-06-22 LAB — VIT B12 BLD-MCNC: 331 PG/ML (ref 211–946)

## 2024-06-22 PROCEDURE — 3078F DIAST BP <80 MM HG: CPT | Performed by: INTERNAL MEDICINE

## 2024-06-22 PROCEDURE — 1126F AMNT PAIN NOTED NONE PRSNT: CPT | Performed by: INTERNAL MEDICINE

## 2024-06-22 PROCEDURE — 3074F SYST BP LT 130 MM HG: CPT | Performed by: INTERNAL MEDICINE

## 2024-06-22 PROCEDURE — 99397 PER PM REEVAL EST PAT 65+ YR: CPT | Performed by: INTERNAL MEDICINE

## 2024-06-22 PROCEDURE — 1160F RVW MEDS BY RX/DR IN RCRD: CPT | Performed by: INTERNAL MEDICINE

## 2024-06-22 PROCEDURE — 1170F FXNL STATUS ASSESSED: CPT | Performed by: INTERNAL MEDICINE

## 2024-06-22 PROCEDURE — G0439 PPPS, SUBSEQ VISIT: HCPCS | Performed by: INTERNAL MEDICINE

## 2024-06-22 PROCEDURE — 1159F MED LIST DOCD IN RCRD: CPT | Performed by: INTERNAL MEDICINE

## 2024-06-22 NOTE — PROGRESS NOTES
The ABCs of the Annual Wellness Visit  Subsequent Medicare Wellness Visit    Chief Complaint   Patient presents with    Annual Exam     Patient is here for an annual medicare wellness exam.      Subjective    History of Present Illness:  Oleg Mahoney is a 68 y.o. male who presents for a Subsequent Medicare Wellness Visit.    The following portions of the patient's history were reviewed and   updated as appropriate: allergies, current medications, past family history, past medical history, past social history, past surgical history, and problem list.     Compared to one year ago, the patient feels his physical   health is the same.    Compared to one year ago, the patient feels his mental   health is the same.    Recent Hospitalizations:  He was not admitted to the hospital during the last year.       Current Medical Providers:  Patient Care Team:  Enrico Reddy MD as PCP - General    Outpatient Medications Prior to Visit   Medication Sig Dispense Refill    lisinopril (PRINIVIL,ZESTRIL) 10 MG tablet Take 1 tablet by mouth Daily. 90 tablet 3    azithromycin (Zithromax Z-Suman) 250 MG tablet Take 2 tablets by mouth on day 1, then 1 tablet daily on days 2-5 6 tablet 0     No facility-administered medications prior to visit.       No opioid medication identified on active medication list. I have reviewed chart for other potential  high risk medication/s and harmful drug interactions in the elderly.        Aspirin is not on active medication list.  Aspirin use is not indicated based on review of current medical condition/s. Risk of harm outweighs potential benefits.  .    Patient Active Problem List   Diagnosis    Essential hypertension    Headache    Routine general medical examination at a health care facility    Sigmoid diverticulosis    Other hyperlipidemia    Pain of left calf     Advance Care Planning   Advance Directive is not on file.  ACP discussion was held with the patient during this visit. Patient does  "not have an advance directive, information provided.    Review of Systems   Constitutional:  Negative for chills, fatigue, fever and unexpected weight change.   HENT:  Negative for congestion, ear pain, hearing loss, rhinorrhea, sinus pressure, sore throat and trouble swallowing.    Eyes:  Negative for discharge and itching.   Respiratory:  Negative for cough, chest tightness and shortness of breath.    Cardiovascular:  Negative for chest pain, palpitations and leg swelling.   Gastrointestinal:  Negative for abdominal pain, blood in stool, constipation, diarrhea and vomiting.        11/20 colonoscopy with 3 mm polyp   Endocrine: Negative for polydipsia and polyuria.   Genitourinary:  Negative for difficulty urinating, dysuria, enuresis, frequency, hematuria and urgency.        6/24 psa   Musculoskeletal:  Positive for arthralgias. Negative for back pain.   Skin:  Negative for rash and wound.   Allergic/Immunologic: Negative for immunocompromised state.   Neurological:  Negative for dizziness, syncope, weakness, light-headedness, numbness and headaches.   Hematological:  Does not bruise/bleed easily.   Psychiatric/Behavioral:  Negative for behavioral problems, dysphoric mood and sleep disturbance. The patient is not nervous/anxious.          Objective       Vitals:    06/22/24 0859   BP: 116/74   Pulse: 75   Temp: 97.1 °F (36.2 °C)   TempSrc: Temporal   SpO2: 95%   Weight: 86.7 kg (191 lb 3.2 oz)   Height: 177.8 cm (70\")   PainSc: 0-No pain     Body mass index is 27.43 kg/m².  BMI has not been calculated during today's encounter.     Does the patient have evidence of cognitive impairment? No    Physical Exam  Constitutional:       Appearance: Normal appearance. He is well-developed.   HENT:      Head: Normocephalic and atraumatic.      Right Ear: External ear normal.      Left Ear: External ear normal.      Nose: Nose normal.      Mouth/Throat:      Mouth: Mucous membranes are moist.      Pharynx: Oropharynx is " clear.   Eyes:      Extraocular Movements: Extraocular movements intact.      Conjunctiva/sclera: Conjunctivae normal.      Pupils: Pupils are equal, round, and reactive to light.   Cardiovascular:      Rate and Rhythm: Normal rate and regular rhythm.      Pulses: Normal pulses.      Heart sounds: Normal heart sounds.   Pulmonary:      Effort: Pulmonary effort is normal.      Breath sounds: Normal breath sounds.   Abdominal:      General: Bowel sounds are normal.      Palpations: Abdomen is soft.   Musculoskeletal:         General: Normal range of motion.      Cervical back: Normal range of motion and neck supple.   Lymphadenopathy:      Cervical: No cervical adenopathy.   Skin:     General: Skin is warm and dry.   Neurological:      General: No focal deficit present.      Mental Status: He is alert and oriented to person, place, and time.   Psychiatric:         Mood and Affect: Mood normal.         Behavior: Behavior normal.         Thought Content: Thought content normal.       Lab Results   Component Value Date    TRIG 70 2024    HDL 58 2024     (H) 2024    VLDL 13 2024    HGBA1C 5.20 2024            HEALTH RISK ASSESSMENT    Smoking Status:  Social History     Tobacco Use   Smoking Status Never   Smokeless Tobacco Never     Alcohol Consumption:  Social History     Substance and Sexual Activity   Alcohol Use None     Fall Risk Screen:    [facility] Fall Risk Assessment was completed, and patient is at LOW risk for falls.Assessment completed on:2024    Depression Screenin/22/2024     9:00 AM   PHQ-2/PHQ-9 Depression Screening   Little Interest or Pleasure in Doing Things 0-->not at all   Feeling Down, Depressed or Hopeless 0-->not at all   PHQ-9: Brief Depression Severity Measure Score 0       Health Habits and Functional and Cognitive Screenin/22/2024     9:00 AM   Functional & Cognitive Status   Do you have difficulty preparing food and eating? No   Do you  have difficulty bathing yourself, getting dressed or grooming yourself? No   Do you have difficulty using the toilet? No   Do you have difficulty moving around from place to place? No   Do you have trouble with steps or getting out of a bed or a chair? No   Current Diet Well Balanced Diet   Dental Exam Up to date   Eye Exam Up to date   Exercise (times per week) 4 times per week   Current Exercises Include Bicycling Outdoors   Do you need help using the phone?  No   Are you deaf or do you have serious difficulty hearing?  No   Do you need help to go to places out of walking distance? No   Do you need help shopping? No   Do you need help preparing meals?  No   Do you need help with housework?  No   Do you need help with laundry? No   Do you need help taking your medications? No   Do you need help managing money? No   Do you ever drive or ride in a car without wearing a seat belt? No   Have you felt unusual stress, anger or loneliness in the last month? No   Who do you live with? Spouse   If you need help, do you have trouble finding someone available to you? No   Have you been bothered in the last four weeks by sexual problems? No   Do you have difficulty concentrating, remembering or making decisions? No       Age-appropriate Screening Schedule:  Refer to the list below for future screening recommendations based on patient's age, sex and/or medical conditions. Orders for these recommended tests are listed in the plan section. The patient has been provided with a written plan.    Health Maintenance   Topic Date Due    ZOSTER VACCINE (1 of 2) Never done    HEPATITIS C SCREENING  Never done    BMI FOLLOWUP  05/31/2023    COVID-19 Vaccine (5 - 2023-24 season) 09/01/2023    RSV Vaccine - Adults (1 - 1-dose 60+ series) 03/20/2025 (Originally 8/17/2014)    Pneumococcal Vaccine 65+ (1 of 1 - PCV) 03/20/2025 (Originally 8/17/2019)    TDAP/TD VACCINES (1 - Tdap) 03/20/2025 (Originally 8/17/1973)    INFLUENZA VACCINE   08/01/2024    LIPID PANEL  06/21/2025    ANNUAL WELLNESS VISIT  06/22/2025    COLORECTAL CANCER SCREENING  11/03/2030              Assessment & Plan     CMS Preventative Services Quick Reference  Risk Factors Identified During Encounter  Immunizations Discussed/Encouraged: Tdap, Prevnar 20 (Pneumococcal 20-valent conjugate), Shingrix, and COVID19  The above risks/problems have been discussed with the patient.  Follow up actions/plans if indicated are seen below in the Assessment/Plan Section.  Pertinent information has been shared with the patient in the After Visit Summary.    There are no diagnoses linked to this encounter.    Follow Up:   Return in about 1 year (around 6/22/2025) for Medicare Wellness, with fasting labs.     An After Visit Summary and PPPS were given to the patient.                 HME-counseled on diet and exercise  diverticulosis-increase fiber, asymptomatic  Hyperlipidemia-counseled on diet and exercise, fasting labs improved  HTN-cont lisinopril qhs, counseled on low Na and to avoid NSAIDS      recent Labs noted and dw patient, counseled on diet      Reviewed the following with the patient: advised patient to avoid alcoholic beverages and encouraged patient to exercise 5-7 days per week for 30 minutes at a time.

## 2025-01-23 DIAGNOSIS — I10 ESSENTIAL HYPERTENSION: ICD-10-CM

## 2025-01-23 RX ORDER — LISINOPRIL 10 MG/1
10 TABLET ORAL DAILY
Qty: 90 TABLET | Refills: 3 | Status: SHIPPED | OUTPATIENT
Start: 2025-01-23